# Patient Record
Sex: FEMALE | HISPANIC OR LATINO | URBAN - METROPOLITAN AREA
[De-identification: names, ages, dates, MRNs, and addresses within clinical notes are randomized per-mention and may not be internally consistent; named-entity substitution may affect disease eponyms.]

---

## 2021-11-20 ENCOUNTER — EMERGENCY (EMERGENCY)
Facility: HOSPITAL | Age: 41
LOS: 1 days | Discharge: HOME | End: 2021-11-20
Admitting: EMERGENCY MEDICINE
Payer: SELF-PAY

## 2021-11-20 VITALS
DIASTOLIC BLOOD PRESSURE: 76 MMHG | OXYGEN SATURATION: 97 % | WEIGHT: 126.1 LBS | HEIGHT: 59 IN | TEMPERATURE: 98 F | SYSTOLIC BLOOD PRESSURE: 111 MMHG | RESPIRATION RATE: 18 BRPM | HEART RATE: 86 BPM

## 2021-11-20 DIAGNOSIS — M25.531 PAIN IN RIGHT WRIST: ICD-10-CM

## 2021-11-20 DIAGNOSIS — Z3A.16 16 WEEKS GESTATION OF PREGNANCY: ICD-10-CM

## 2021-11-20 DIAGNOSIS — M25.562 PAIN IN LEFT KNEE: ICD-10-CM

## 2021-11-20 DIAGNOSIS — O9A.212 INJURY, POISONING AND CERTAIN OTHER CONSEQUENCES OF EXTERNAL CAUSES COMPLICATING PREGNANCY, SECOND TRIMESTER: ICD-10-CM

## 2021-11-20 PROCEDURE — 99283 EMERGENCY DEPT VISIT LOW MDM: CPT

## 2021-11-20 PROCEDURE — 99283 EMERGENCY DEPT VISIT LOW MDM: CPT | Mod: 25

## 2021-11-20 PROCEDURE — 73110 X-RAY EXAM OF WRIST: CPT | Mod: 26,RT

## 2021-11-20 PROCEDURE — 73110 X-RAY EXAM OF WRIST: CPT

## 2021-11-20 RX ORDER — ACETAMINOPHEN 500 MG
650 TABLET ORAL ONCE
Refills: 0 | Status: COMPLETED | OUTPATIENT
Start: 2021-11-20 | End: 2021-11-20

## 2021-11-20 RX ADMIN — Medication 650 MILLIGRAM(S): at 14:25

## 2021-11-20 RX ADMIN — Medication 650 MILLIGRAM(S): at 14:58

## 2021-11-20 NOTE — ED ADULT NURSE NOTE - OBJECTIVE STATEMENT
pt reports trip and fall on sidewalk just prior to arrival today.  pt states landed on left side, but hit right wrist on the sidewalk.  pt denies injury to head.  pt states is 16 weeks pregnant, .  denies abdominal pain and vaginal bleeding.  pt reports abrasion to left anterior knee and right wrist pain.   superficial abrasion noted to right anterior knee - cleansed with nss.   no deformity or swelling noted to right wrist.  2+ b/l radial pulses. pt reports trip and fall on sidewalk just prior to arrival today.  pt states landed on left side, but hit right wrist on the sidewalk.  pt denies injury to head.  pt states is 16 weeks pregnant, .  denies abdominal pain and vaginal bleeding.  pt reports abrasion to left anterior knee and right wrist pain.   superficial abrasion noted to left anterior knee - cleansed with nss.   no deformity or swelling noted to right wrist.  2+ b/l radial pulses.

## 2021-11-20 NOTE — ED PROVIDER NOTE - NSFOLLOWUPINSTRUCTIONS_ED_ALL_ED_FT
Dolor en la dory en los adultos    Wrist Pain, Adult    Muchos factores pueden causar dolor en la dory. Algunas causas frecuentes son las siguientes:  •Jai lesión en la ryan de la dory, dann un esguince, jai distensión o jai fractura.      •Uso excesivo de la articulación.      •Jai afección que causa el aumento de la presión en un nervio de la dory (síndrome del túnel carpiano).      •El desgaste normal de las articulaciones que ocurre con la edad (artrosis).      •Otros motivos de inflamación y rigidez de las articulaciones (artritis)      A veces, la causa del dolor en la dory no se conoce. El dolor suele desaparecer cuando se siguen las indicaciones del médico sobre la forma de aliviarlo en casa, dann dejar la dory en reposo, aplicar hielo a la dory o usar jai férula o un vendaje elástico pauly un período corto de tiempo. Si el dolor en la dory continúa, es importante consultar al médico.      Siga estas instrucciones en spaulding casa:    Si tiene jai férula o un vendaje elástico:     •Use la férula o el vendaje dann se lo haya indicado el médico. Quíteselos solamente dann se lo haya indicado el médico. Pregúntele al médico si puede quitárselos para bañarse.      •Afloje la férula o el vendaje si los dedos se le adormecen, siente hormigueo o se le enfrían y se tornan azulados.      •Controle todos los días la piel alrededor de la férula o el vendaje. Informe al médico acerca de cualquier inquietud.      •Mantenga la férula o el vendaje limpios.    •Si la férula o el vendaje no son impermeables:  •No deje que se mojen.      •Cúbralos con un envoltorio hermético cuando tome un baño de inmersión o jai ducha.          Control del dolor, la rigidez y la hinchazón    •Si se lo indican, aplique hielo sobre la ryan dolorida. Para hacer esto:  •Si tiene jai férula o un vendaje desmontable, quíteselo dann se lo haya indicado el médico.      •Ponga el hielo en jai bolsa plástica.      •Coloque jai toalla entre la piel y la bolsa de hielo, o entre la férula o el vendaje y la bolsa de hielo.      •Aplique el hielo pauly 20 minutos, 2 o 3 veces por día.        •Mueva los dedos con frecuencia para reducir la rigidez y la hinchazón.      •Cuando esté sentado o acostado, alce (eleve) la ryan de la lesión por encima del nivel del corazón.      Actividad     •Deje reposar la dory afectada dann se lo haya indicado el médico.      •Retome betzaida actividades normales según lo indicado por el médico. Pregúntele al médico qué actividades son seguras para usted.      •Pregúntele al médico cuándo puede volver a conducir si tiene jai férula o un vendaje en la dory.      •Rosenda ejercicio dann se lo haya indicado el médico.      Instrucciones generales     •Esté atento a cualquier cambio en los síntomas.      •Use los medicamentos de venta curtis y los recetados solamente dann se lo haya indicado el médico.      •Concurra a todas las visitas de seguimiento dann se lo haya indicado el médico. Kenedy es importante.        Comuníquese con un médico si:    •Tiene un dolor barbara repentino en la dory, la mano o el brazo que es diferente o nuevo.      •La hinchazón o los moretones en la dory o la mano empeoran.      •La piel se enrojece, aparece jai erupción o tiene llagas abiertas.      •El dolor no mejora o empeora.      •Tiene fiebre o escalofríos.        Solicite ayuda de inmediato si:    •Pierde la sensibilidad en la mano o en los dedos de la mano.      •Los dedos se tornan de color dennison, están muy enrojecidos o están fríos y azulados.      •No puede  los dedos.        Resumen    •El dolor en la dory en un adulto puede tener muchas causas diferentes.      •Si el dolor en la dory continúa, es importante consultar al médico.      •Es posible que deba usar jai férula o un vendaje elástico pauly un período corto de tiempo.      •Retome betzaida actividades normales según lo indicado por el médico. Pregúntele al médico qué actividades son seguras para usted.      Esta información no tiene dann fin reemplazar el consejo del médico. Asegúrese de hacerle al médico cualquier pregunta que tenga.

## 2021-11-20 NOTE — ED PROVIDER NOTE - CLINICAL SUMMARY MEDICAL DECISION MAKING FREE TEXT BOX
39 y/o f 16 wks pregnant presents s/p trip and fall c/o right wrist pain, left knee pain; no head trauma, vss, pt ambulatory in ED.  Pt with mild tenderness to right wrist, verbally consented for xr which is neg, not concerned for left knee fracture, no xr done.  Pt given tylenol for pain.  Bedside u/s shows +IUP with FH of 144.  Will d/c, tylenol PRN pain, ice to left knee and right wrist, f/u with her OB

## 2021-11-20 NOTE — ED PROVIDER NOTE - OBJECTIVE STATEMENT
#[id number]    39 y/o f 16 wks pregnant presents s/p trip and fall today while walking on the street.  Pt reports falling on her right wrist and left knee, has pain to both and mild pain to left upper chest.  Pt denies head trauma, abd pain, vag bleeding, all other ROS negative.

## 2021-11-20 NOTE — ED ADULT TRIAGE NOTE - CHIEF COMPLAINT QUOTE
41 y/o female c/o left arm and left knee pain s/p0 trip and fall today, pt reports she is in her 16th week of pregnancy.

## 2021-11-20 NOTE — ED PROVIDER NOTE - PATIENT PORTAL LINK FT
You can access the FollowMyHealth Patient Portal offered by [facility] by registering at the following website: [email or web address]. By joining [facility]’s FollowMyHealth portal, you will also be able to view your health information using other applications (apps) compatible with our system.

## 2021-11-20 NOTE — ED PROVIDER NOTE - MUSCULOSKELETAL, MLM
right wrist +mild tenderness on volar aspect, no anatomical snuffbox tenderness, +FROM.  left knee with no bony tenderness, +FROM, strength 5/5, sensation intact distally